# Patient Record
Sex: FEMALE | Race: WHITE | NOT HISPANIC OR LATINO | Employment: OTHER | URBAN - NONMETROPOLITAN AREA
[De-identification: names, ages, dates, MRNs, and addresses within clinical notes are randomized per-mention and may not be internally consistent; named-entity substitution may affect disease eponyms.]

---

## 2023-04-26 ENCOUNTER — TRANSCRIBE ORDERS (OUTPATIENT)
Dept: CT IMAGING | Facility: CLINIC | Age: 58
End: 2023-04-26
Payer: OTHER GOVERNMENT

## 2023-04-26 DIAGNOSIS — T14.8XXA PUNCTURE WOUND: Primary | ICD-10-CM

## 2024-04-23 ENCOUNTER — CONSULT (OUTPATIENT)
Dept: ONCOLOGY | Facility: CLINIC | Age: 59
End: 2024-04-23
Payer: OTHER GOVERNMENT

## 2024-04-23 VITALS
RESPIRATION RATE: 18 BRPM | TEMPERATURE: 98 F | HEART RATE: 77 BPM | HEIGHT: 65 IN | BODY MASS INDEX: 31.82 KG/M2 | SYSTOLIC BLOOD PRESSURE: 145 MMHG | DIASTOLIC BLOOD PRESSURE: 98 MMHG | WEIGHT: 191 LBS | OXYGEN SATURATION: 97 %

## 2024-04-23 DIAGNOSIS — R77.8 ABNORMAL SPEP: Primary | ICD-10-CM

## 2024-04-23 PROCEDURE — 99204 OFFICE O/P NEW MOD 45 MIN: CPT | Performed by: INTERNAL MEDICINE

## 2024-04-23 RX ORDER — OXYBUTYNIN CHLORIDE 15 MG/1
TABLET, EXTENDED RELEASE ORAL
COMMUNITY
Start: 2024-03-05

## 2024-04-23 RX ORDER — BUSPIRONE HYDROCHLORIDE 5 MG/1
TABLET ORAL
COMMUNITY
Start: 2024-03-22

## 2024-04-23 RX ORDER — ESTRADIOL 0.05 MG/D
PATCH, EXTENDED RELEASE TRANSDERMAL
COMMUNITY
Start: 2024-02-23

## 2024-04-23 RX ORDER — DESVENLAFAXINE SUCCINATE 50 MG/1
1 TABLET, EXTENDED RELEASE ORAL DAILY
COMMUNITY

## 2024-04-23 RX ORDER — LEVOTHYROXINE SODIUM 0.05 MG/1
TABLET ORAL
COMMUNITY
Start: 2024-03-05

## 2024-04-23 RX ORDER — TRAMADOL HYDROCHLORIDE 50 MG/1
TABLET ORAL
COMMUNITY

## 2024-04-23 RX ORDER — ROPINIROLE 0.5 MG/1
TABLET, FILM COATED ORAL
COMMUNITY
Start: 2024-03-18

## 2024-04-23 RX ORDER — PREGABALIN 75 MG/1
1 CAPSULE ORAL EVERY 12 HOURS
COMMUNITY
Start: 2024-03-29

## 2024-04-23 RX ORDER — SUMATRIPTAN 100 MG/1
TABLET, FILM COATED ORAL
COMMUNITY
Start: 2024-02-25

## 2024-04-23 NOTE — PROGRESS NOTES
CHIEF COMPLAINT: Diffuse muscle and joint aches    REASON FOR REFERRAL: Elevated IgA and beta-2 globulin      RECORDS OBTAINED  Records of the patients history including those obtained from primary care were reviewed and summarized in detail.    Oncology/Hematology History Overview Note   1.  Elevated beta-2 globulin 0.6 upper limit of normal 0.5 with globulin 0.1 above normal  2.  Migraines  3.  Arthritis with weakly positive MAURICE and history of neck surgery fusion      Hematology history timeline:  -2/21/2024Elevated beta-2 globulin 0.6 upper limit of normal 0.5.  Serum globulin 3.8 upper limit of normal 3.7 previously 3.9 in January.  Otherwise unremarkable CMP.  Elevated total IgA 495 with upper limit of normal 310 and normal quantitative immunoglobulin G and M.  CBC unremarkable including differential and no anemia with hemoglobin 14.6     Abnormal SPEP       HISTORY OF PRESENT ILLNESS:  The patient is a 59 y.o.  female, referred for elevated beta-2 globulin and IgA in the face of fibromyalgia with diffuse myalgias and arthritis    REVIEW OF SYSTEMS:  Other than arthritic and muscle pain has no bone pain per se.  No other somatic complaints    Past Medical History:   Diagnosis Date    Disease of thyroid gland      Past Surgical History:   Procedure Laterality Date    HAMMER TOE REPAIR         Current Outpatient Medications on File Prior to Visit   Medication Sig Dispense Refill    busPIRone (BUSPAR) 5 MG tablet       estradiol (MINIVELLE, VIVELLE-DOT) 0.05 MG/24HR patch       levothyroxine (SYNTHROID, LEVOTHROID) 50 MCG tablet       oxybutynin XL (DITROPAN XL) 15 MG 24 hr tablet       pregabalin (LYRICA) 75 MG capsule Take 1 capsule by mouth Every 12 (Twelve) Hours.      rOPINIRole (REQUIP) 0.5 MG tablet       SUMAtriptan (IMITREX) 100 MG tablet       CeleBREX 200 MG capsule Take 1 capsule by mouth Every 12 (Twelve) Hours.      desvenlafaxine (PRISTIQ) 50 MG 24 hr tablet Take 1 tablet by mouth Daily.       "traMADol (ULTRAM) 50 MG tablet Take 1-2 tablets every 8 hours as needed       No current facility-administered medications on file prior to visit.       Allergies   Allergen Reactions    Oxycodone Hives       Social History     Socioeconomic History    Marital status: Single   Tobacco Use    Smoking status: Never    Smokeless tobacco: Never   Substance and Sexual Activity    Alcohol use: Yes     Alcohol/week: 2.0 standard drinks of alcohol     Types: 2 Glasses of wine per week     Comment: 2 per month    Drug use: Never       Family History   Problem Relation Age of Onset    Dementia Father        PHYSICAL EXAM:  No jaundice icterus or pallor.  No respiratory distress.  No palpable cervical axillary or inguinal adenopathy.  No hepatosplenomegaly.  No chantal synovitis.  No rashes.    /98   Pulse 77   Temp 98 °F (36.7 °C)   Resp 18   Ht 165.1 cm (65\")   Wt 86.6 kg (191 lb)   SpO2 97%   BMI 31.78 kg/m²     ECOG score: 0           ECOG: (0) Fully Active - Able to Carry On All Pre-disease Performance Without Restriction    No results found for: \"HGB\", \"HCT\", \"MCV\", \"PLT\", \"WBC\", \"NEUTROABS\", \"LYMPHSABS\", \"MONOSABS\", \"EOSABS\", \"BASOSABS\"  No results found for: \"GLUCOSE\", \"BUN\", \"CREATININE\", \"NA\", \"K\", \"CL\", \"CO2\", \"CALCIUM\", \"PROTEINTOT\", \"ALBUMIN\", \"BILITOT\", \"ALKPHOS\", \"AST\", \"ALT\"      Assessment & Plan   1.  Elevated beta-2 globulin 0.6 upper limit of normal 0.5 with globulin 0.1 above normal  2.  Migraines  3.  Arthritis with weakly positive MAURICE and history of neck surgery fusion      Hematology history timeline:  -2/21/2024Elevated beta-2 globulin 0.6 upper limit of normal 0.5.  Serum globulin 3.8 upper limit of normal 3.7 previously 3.9 in January.  Otherwise unremarkable CMP.  Elevated total IgA 495 with upper limit of normal 310 and normal quantitative immunoglobulin G and M.  CBC unremarkable including differential and no anemia with hemoglobin 14.6    -4/23/2024 [facility] hematology consult: Patient " comes today because of barely elevated beta-2 globulin and mildly elevated IgA.  Normal CK.  Does have a history of positive MAURICE and all of this can just be autoimmune inflammatory markers as can be the nonspecific MAURICE.  Having said that I will check her serum M panel to be sure there is no monoclonality but polyclonal elevations of IgA and scant elevations of beta-2 globulin in the absence of monoclonality are clinically inconsequential and need no further workup.  She has no hypercalcemia or renal disease or elevations of her alkaline phosphatase.  No abnormalities of her total protein, globulin, albumin, or ratios thereof.  Has various and sundry articular and muscular aches and pains but no specific focal bone pain to suggest lytic disease but should we find monoclonality we will do further bone imaging.  No CRAB criteria in the normal nature of her immunoglobulin IgG and IgM also diminish the likelihood of significant plasma cell dyscrasia or lymphoplasmacytic disorders.  She will continue to see Dr. Soto Winters for fibromyalgia.  Suspect that is the nidus of all of this.    Total time of care today inclusive of time spent today prior to patient's arrival reviewing past hematologic history and cataloging as above and during visit interviewing for signs or symptoms of disease relating to her abnormal protein levels and broad differential thereof and after visit instituting the plan as outlined above took 45 minutes of patient care time throughout the dayToday.  Douglas Mays MD    4/23/2024

## 2024-04-23 NOTE — LETTER
April 23, 2024       No Recipients    Patient: Payton Parks   YOB: 1965   Date of Visit: 4/23/2024     Dear ANKIT Duffy:       Thank you for referring Payton Parks to me for evaluation. Below are the relevant portions of my assessment and plan of care.    If you have questions, please do not hesitate to call me. I look forward to following Payton along with you.         Sincerely,        Douglas Mays MD        CC:   No Recipients    Douglas Mays MD  04/23/24 1326  Sign when Signing Visit  CHIEF COMPLAINT: Diffuse muscle and joint aches    REASON FOR REFERRAL: Elevated IgA and beta-2 globulin      RECORDS OBTAINED  Records of the patients history including those obtained from primary care were reviewed and summarized in detail.    Oncology/Hematology History Overview Note   1.  Elevated beta-2 globulin 0.6 upper limit of normal 0.5 with globulin 0.1 above normal  2.  Migraines  3.  Arthritis with weakly positive MAURICE and history of neck surgery fusion      Hematology history timeline:  -2/21/2024Elevated beta-2 globulin 0.6 upper limit of normal 0.5.  Serum globulin 3.8 upper limit of normal 3.7 previously 3.9 in January.  Otherwise unremarkable CMP.  Elevated total IgA 495 with upper limit of normal 310 and normal quantitative immunoglobulin G and M.  CBC unremarkable including differential and no anemia with hemoglobin 14.6     Abnormal SPEP       HISTORY OF PRESENT ILLNESS:  The patient is a 59 y.o.  female, referred for elevated beta-2 globulin and IgA in the face of fibromyalgia with diffuse myalgias and arthritis    REVIEW OF SYSTEMS:  Other than arthritic and muscle pain has no bone pain per se.  No other somatic complaints    Past Medical History:   Diagnosis Date   • Disease of thyroid gland      Past Surgical History:   Procedure Laterality Date   • HAMMER TOE REPAIR         Current Outpatient Medications on File Prior to Visit   Medication Sig Dispense Refill   • busPIRone  "(BUSPAR) 5 MG tablet      • estradiol (MINIVELLE, VIVELLE-DOT) 0.05 MG/24HR patch      • levothyroxine (SYNTHROID, LEVOTHROID) 50 MCG tablet      • oxybutynin XL (DITROPAN XL) 15 MG 24 hr tablet      • pregabalin (LYRICA) 75 MG capsule Take 1 capsule by mouth Every 12 (Twelve) Hours.     • rOPINIRole (REQUIP) 0.5 MG tablet      • SUMAtriptan (IMITREX) 100 MG tablet      • CeleBREX 200 MG capsule Take 1 capsule by mouth Every 12 (Twelve) Hours.     • desvenlafaxine (PRISTIQ) 50 MG 24 hr tablet Take 1 tablet by mouth Daily.     • traMADol (ULTRAM) 50 MG tablet Take 1-2 tablets every 8 hours as needed       No current facility-administered medications on file prior to visit.       Allergies   Allergen Reactions   • Oxycodone Hives       Social History     Socioeconomic History   • Marital status: Single   Tobacco Use   • Smoking status: Never   • Smokeless tobacco: Never   Substance and Sexual Activity   • Alcohol use: Yes     Alcohol/week: 2.0 standard drinks of alcohol     Types: 2 Glasses of wine per week     Comment: 2 per month   • Drug use: Never       Family History   Problem Relation Age of Onset   • Dementia Father        PHYSICAL EXAM:  No jaundice icterus or pallor.  No respiratory distress.  No palpable cervical axillary or inguinal adenopathy.  No hepatosplenomegaly.  No chantal synovitis.  No rashes.    /98   Pulse 77   Temp 98 °F (36.7 °C)   Resp 18   Ht 165.1 cm (65\")   Wt 86.6 kg (191 lb)   SpO2 97%   BMI 31.78 kg/m²     ECOG score: 0           ECOG: (0) Fully Active - Able to Carry On All Pre-disease Performance Without Restriction    No results found for: \"HGB\", \"HCT\", \"MCV\", \"PLT\", \"WBC\", \"NEUTROABS\", \"LYMPHSABS\", \"MONOSABS\", \"EOSABS\", \"BASOSABS\"  No results found for: \"GLUCOSE\", \"BUN\", \"CREATININE\", \"NA\", \"K\", \"CL\", \"CO2\", \"CALCIUM\", \"PROTEINTOT\", \"ALBUMIN\", \"BILITOT\", \"ALKPHOS\", \"AST\", \"ALT\"      Assessment & Plan  1.  Elevated beta-2 globulin 0.6 upper limit of normal 0.5 with globulin " 0.1 above normal  2.  Migraines  3.  Arthritis with weakly positive MAURICE and history of neck surgery fusion      Hematology history timeline:  -2/21/2024Elevated beta-2 globulin 0.6 upper limit of normal 0.5.  Serum globulin 3.8 upper limit of normal 3.7 previously 3.9 in January.  Otherwise unremarkable CMP.  Elevated total IgA 495 with upper limit of normal 310 and normal quantitative immunoglobulin G and M.  CBC unremarkable including differential and no anemia with hemoglobin 14.6    -4/23/2024 [facility]ology consult: Patient comes today because of barely elevated beta-2 globulin and mildly elevated IgA.  Normal CK.  Does have a history of positive MAURICE and all of this can just be autoimmune inflammatory markers as can be the nonspecific MAURICE.  Having said that I will check her serum M panel to be sure there is no monoclonality but polyclonal elevations of IgA and scant elevations of beta-2 globulin in the absence of monoclonality are clinically inconsequential and need no further workup.  She has no hypercalcemia or renal disease or elevations of her alkaline phosphatase.  No abnormalities of her total protein, globulin, albumin, or ratios thereof.  Has various and sundry articular and muscular aches and pains but no specific focal bone pain to suggest lytic disease but should we find monoclonality we will do further bone imaging.  No CRAB criteria in the normal nature of her immunoglobulin IgG and IgM also diminish the likelihood of significant plasma cell dyscrasia or lymphoplasmacytic disorders.  She will continue to see Dr. Soto Winters for fibromyalgia.  Suspect that is the nidus of all of this.    Total time of care today inclusive of time spent today prior to patient's arrival reviewing past hematologic history and cataloging as above and during visit interviewing for signs or symptoms of disease relating to her abnormal protein levels and broad differential thereof and after visit instituting the plan as  outlined above took 45 minutes of patient care time throughout the dayToday.  Douglas Mays MD    4/23/2024

## 2024-05-01 LAB
ALBUMIN 24H MFR UR ELPH: 60.8 %
ALPHA1 GLOB 24H MFR UR ELPH: 4.8 %
ALPHA2 GLOB 24H MFR UR ELPH: 13.7 %
B-GLOBULIN MFR UR ELPH: 11.4 %
GAMMA GLOB 24H MFR UR ELPH: 9.3 %
INTERPRETATION UR IFE-IMP: NORMAL
Lab: NORMAL
M PROTEIN 24H MFR UR ELPH: NORMAL %
PROT 24H UR-MRATE: 113 MG/24 HR (ref 30–150)
PROT UR-MCNC: 4.2 MG/DL

## 2024-05-02 LAB
ALBUMIN SERPL ELPH-MCNC: 3.6 G/DL (ref 2.9–4.4)
ALBUMIN SERPL-MCNC: 4.1 G/DL (ref 3.8–4.9)
ALBUMIN/GLOB SERPL: 1.1 {RATIO} (ref 0.7–1.7)
ALBUMIN/GLOB SERPL: 1.4 {RATIO} (ref 1.2–2.2)
ALP SERPL-CCNC: 106 IU/L (ref 44–121)
ALPHA1 GLOB SERPL ELPH-MCNC: 0.2 G/DL (ref 0–0.4)
ALPHA2 GLOB SERPL ELPH-MCNC: 0.6 G/DL (ref 0.4–1)
ALT SERPL-CCNC: 28 IU/L (ref 0–32)
AST SERPL-CCNC: 28 IU/L (ref 0–40)
B-GLOBULIN SERPL ELPH-MCNC: 1.2 G/DL (ref 0.7–1.3)
B2 MICROGLOB SERPL-MCNC: 1.7 MG/L (ref 0.6–2.4)
BASOPHILS # BLD AUTO: 0 X10E3/UL (ref 0–0.2)
BASOPHILS NFR BLD AUTO: 1 %
BILIRUB SERPL-MCNC: 0.2 MG/DL (ref 0–1.2)
BUN SERPL-MCNC: 7 MG/DL (ref 6–24)
BUN/CREAT SERPL: 10 (ref 9–23)
CALCIUM SERPL-MCNC: 9.7 MG/DL (ref 8.7–10.2)
CHLORIDE SERPL-SCNC: 100 MMOL/L (ref 96–106)
CO2 SERPL-SCNC: 24 MMOL/L (ref 20–29)
CREAT SERPL-MCNC: 0.73 MG/DL (ref 0.57–1)
CRP SERPL-MCNC: 2 MG/L (ref 0–10)
EGFRCR SERPLBLD CKD-EPI 2021: 95 ML/MIN/1.73
EOSINOPHIL # BLD AUTO: 0.2 X10E3/UL (ref 0–0.4)
EOSINOPHIL NFR BLD AUTO: 5 %
ERYTHROCYTE [DISTWIDTH] IN BLOOD BY AUTOMATED COUNT: 12.8 % (ref 11.7–15.4)
ERYTHROCYTE [SEDIMENTATION RATE] IN BLOOD BY WESTERGREN METHOD: 3 MM/HR (ref 0–40)
GAMMA GLOB SERPL ELPH-MCNC: 1.3 G/DL (ref 0.4–1.8)
GLOBULIN SER CALC-MCNC: 2.9 G/DL (ref 1.5–4.5)
GLOBULIN SER-MCNC: 3.4 G/DL (ref 2.2–3.9)
GLUCOSE SERPL-MCNC: 96 MG/DL (ref 70–99)
HCT VFR BLD AUTO: 39.2 % (ref 34–46.6)
HGB BLD-MCNC: 13.2 G/DL (ref 11.1–15.9)
IGA SERPL-MCNC: 420 MG/DL (ref 87–352)
IGE SERPL-ACNC: 115 IU/ML (ref 6–495)
IGG SERPL-MCNC: 1358 MG/DL (ref 586–1602)
IGM SERPL-MCNC: 152 MG/DL (ref 26–217)
IMM GRANULOCYTES # BLD AUTO: 0 X10E3/UL (ref 0–0.1)
IMM GRANULOCYTES NFR BLD AUTO: 0 %
INTERPRETATION SERPL IEP-IMP: ABNORMAL
KAPPA LC FREE SER-MCNC: 29.7 MG/L (ref 3.3–19.4)
KAPPA LC FREE/LAMBDA FREE SER: 1.25 {RATIO} (ref 0.26–1.65)
LABORATORY COMMENT REPORT: ABNORMAL
LAMBDA LC FREE SERPL-MCNC: 23.8 MG/L (ref 5.7–26.3)
LYMPHOCYTES # BLD AUTO: 1.8 X10E3/UL (ref 0.7–3.1)
LYMPHOCYTES NFR BLD AUTO: 39 %
M PROTEIN SERPL ELPH-MCNC: ABNORMAL G/DL
MCH RBC QN AUTO: 30.6 PG (ref 26.6–33)
MCHC RBC AUTO-ENTMCNC: 33.7 G/DL (ref 31.5–35.7)
MCV RBC AUTO: 91 FL (ref 79–97)
MONOCYTES # BLD AUTO: 0.5 X10E3/UL (ref 0.1–0.9)
MONOCYTES NFR BLD AUTO: 10 %
NEUTROPHILS # BLD AUTO: 2.1 X10E3/UL (ref 1.4–7)
NEUTROPHILS NFR BLD AUTO: 45 %
PLATELET # BLD AUTO: 374 X10E3/UL (ref 150–450)
POTASSIUM SERPL-SCNC: 4.6 MMOL/L (ref 3.5–5.2)
PROT SERPL-MCNC: 7 G/DL (ref 6–8.5)
RBC # BLD AUTO: 4.31 X10E6/UL (ref 3.77–5.28)
SODIUM SERPL-SCNC: 138 MMOL/L (ref 134–144)
WBC # BLD AUTO: 4.7 X10E3/UL (ref 3.4–10.8)

## 2024-06-11 ENCOUNTER — OFFICE VISIT (OUTPATIENT)
Dept: ONCOLOGY | Facility: CLINIC | Age: 59
End: 2024-06-11
Payer: OTHER GOVERNMENT

## 2024-06-11 VITALS
BODY MASS INDEX: 31.49 KG/M2 | HEART RATE: 76 BPM | RESPIRATION RATE: 18 BRPM | HEIGHT: 65 IN | WEIGHT: 189 LBS | SYSTOLIC BLOOD PRESSURE: 141 MMHG | DIASTOLIC BLOOD PRESSURE: 93 MMHG | OXYGEN SATURATION: 97 % | TEMPERATURE: 97.1 F

## 2024-06-11 DIAGNOSIS — R77.8 ABNORMAL SPEP: Primary | ICD-10-CM

## 2024-06-11 PROCEDURE — 99214 OFFICE O/P EST MOD 30 MIN: CPT | Performed by: INTERNAL MEDICINE

## 2024-06-11 NOTE — PROGRESS NOTES
CHIEF COMPLAINT: Arthritis    Problem List:  Oncology/Hematology History Overview Note   1.  Elevated beta-2 globulin 0.6 upper limit of normal 0.5 with globulin 0.1 above normal  2.  Migraines  3.  Arthritis with weakly positive MAURICE and history of neck surgery fusion      Hematology history timeline:  -2/21/2024Elevated beta-2 globulin 0.6 upper limit of normal 0.5.  Serum globulin 3.8 upper limit of normal 3.7 previously 3.9 in January.  Otherwise unremarkable CMP.  Elevated total IgA 495 with upper limit of normal 310 and normal quantitative immunoglobulin G and M.  CBC unremarkable including differential and no anemia with hemoglobin 14.6    -4/23/2024 [facility] hematology consult: Patient comes today because of barely elevated beta-2 globulin and mildly elevated IgA.  Normal CK.  Does have a history of positive MAURICE and all of this can just be autoimmune inflammatory markers as can be the nonspecific MAURICE.  Having said that I will check her serum M panel to be sure there is no monoclonality but polyclonal elevations of IgA and scant elevations of beta-2 globulin in the absence of monoclonality are clinically inconsequential and need no further workup.  She has no hypercalcemia or renal disease or elevations of her alkaline phosphatase.  No abnormalities of her total protein, globulin, albumin, or ratios thereof.  Has various and sundry articular and muscular aches and pains but no specific focal bone pain to suggest lytic disease but should we find monoclonality we will do further bone imaging.  No CRAB criteria in the normal nature of her immunoglobulin IgG and IgM also diminish the likelihood of significant plasma cell dyscrasia or lymphoplasmacytic disorders.    -4/25/2024 CBC unremarkable with hemoglobin 13.2.  CMP unremarkable with creatinine 0.73 and calcium 9.7 with normal total protein, albumin, globulin, ratios thereof.  Total IgA slightly elevated 420 upper limit of normal 352 with otherwise normal  "quantitative immunoglobulins and no M spike and no abnormalities of beta globulin.  Inconsequential mild elevation of serum kappa 29.7 (upper limit of normal 19.4) lambda normal 23.8 with ratio 1.25.  Sedimentation rate 3.  C-reactive protein 2.  Beta-2 microglobulin 1.7.  All normal.  Urine immunofixation electrophoresis protein electrophoresis showed no M spike and normal 24-hour urine protein.    -6/11/2024 [facility]ology follow-up: Reviewed the 4/25/2024 data with her.  No monoclonal gammopathy and no significant abnormalities of her SIFE/SIEP/U IEP/serum light chains and no hematological abnormalities.  Does not need further evaluation or workup.  No need for follow-up with hematologist.  In concert scant elevation of kappa likely related to positive MAURICE that is also likely nonspecific but defer to primary care rheumatology referral as to relevance with history of neck surgery fusion and arthritis.     Abnormal SPEP       HISTORY OF PRESENT ILLNESS:  The patient is a 59 y.o. female, here for follow up on management of arthritis.  Questionable abnormality of serum protein electrophoresis    Past Medical History:   Diagnosis Date    Disease of thyroid gland     Fibromyalgia     Migraines      Past Surgical History:   Procedure Laterality Date    HAMMER TOE REPAIR         Allergies   Allergen Reactions    Oxycodone Hives       Family History and Social History reviewed and changed as necessary    REVIEW OF SYSTEM:   Note new somatic complaint    PHYSICAL EXAM:  Jaundice icterus or pallor.  No respiratory distress.  No rashes.  No chantal synovitis.  No abdominal tenderness.    Vitals:    06/11/24 1459   Resp: 18   Height: 165.1 cm (65\")     There were no vitals filed for this visit.       ECOG score: 0           Vitals reviewed.      Lab Results   Component Value Date    HGB 13.2 04/25/2024    HCT 39.2 04/25/2024    MCV 91 04/25/2024     04/25/2024    WBC 4.7 04/25/2024    NEUTROABS 2.1 04/25/2024    " LYMPHSABS 1.8 04/25/2024    MONOSABS 0.5 04/25/2024    EOSABS 0.2 04/25/2024    BASOSABS 0.0 04/25/2024       Lab Results   Component Value Date    GLUCOSE 96 04/25/2024    BUN 7 04/25/2024    CREATININE 0.73 04/25/2024     04/25/2024    K 4.6 04/25/2024     04/25/2024    CO2 24 04/25/2024    CALCIUM 9.7 04/25/2024    ALBUMIN 3.6 04/25/2024    ALBUMIN 4.1 04/25/2024    BILITOT 0.2 04/25/2024    ALKPHOS 106 04/25/2024    AST 28 04/25/2024    ALT 28 04/25/2024             ASSESSMENT & PLAN:  1.  Elevated beta-2 globulin 0.6 upper limit of normal 0.5 with globulin 0.1 above normal  2.  Migraines  3.  Arthritis with weakly positive MAURICE and history of neck surgery fusion      Hematology history timeline:  -2/21/2024Elevated beta-2 globulin 0.6 upper limit of normal 0.5.  Serum globulin 3.8 upper limit of normal 3.7 previously 3.9 in January.  Otherwise unremarkable CMP.  Elevated total IgA 495 with upper limit of normal 310 and normal quantitative immunoglobulin G and M.  CBC unremarkable including differential and no anemia with hemoglobin 14.6    -4/23/2024 [facility] hematology consult: Patient comes today because of barely elevated beta-2 globulin and mildly elevated IgA.  Normal CK.  Does have a history of positive MAURICE and all of this can just be autoimmune inflammatory markers as can be the nonspecific MAURICE.  Having said that I will check her serum M panel to be sure there is no monoclonality but polyclonal elevations of IgA and scant elevations of beta-2 globulin in the absence of monoclonality are clinically inconsequential and need no further workup.  She has no hypercalcemia or renal disease or elevations of her alkaline phosphatase.  No abnormalities of her total protein, globulin, albumin, or ratios thereof.  Has various and sundry articular and muscular aches and pains but no specific focal bone pain to suggest lytic disease but should we find monoclonality we will do further bone imaging.  No CRAB  criteria in the normal nature of her immunoglobulin IgG and IgM also diminish the likelihood of significant plasma cell dyscrasia or lymphoplasmacytic disorders.    -4/25/2024 CBC unremarkable with hemoglobin 13.2.  CMP unremarkable with creatinine 0.73 and calcium 9.7 with normal total protein, albumin, globulin, ratios thereof.  Total IgA slightly elevated 420 upper limit of normal 352 with otherwise normal quantitative immunoglobulins and no M spike and no abnormalities of beta globulin.  Inconsequential mild elevation of serum kappa 29.7 (upper limit of normal 19.4) lambda normal 23.8 with ratio 1.25.  Sedimentation rate 3.  C-reactive protein 2.  Beta-2 microglobulin 1.7.  All normal.  Urine immunofixation electrophoresis protein electrophoresis showed no M spike and normal 24-hour urine protein.    -6/11/2024 [facility] follow-up: Reviewed the 4/25/2024 data with her.  No monoclonal gammopathy and no significant abnormalities of her SIFE/SIEP/U IEP/serum light chains and no hematological abnormalities.  Does not need further evaluation or workup.  No need for follow-up with hematologist.  In concert scant elevation of kappa likely related to positive MAURICE that is also likely nonspecific but defer to primary care rheumatology referral as to relevance with history of neck surgery fusion and arthritis.    Total time of care today inclusive of time spent today prior to patient's arrival reviewing interval data as outlined above and during visit translating that information to her and communicating this subsequently to her primary care and after visit instituting this plan took 33 minutes patient care time throughout the day today.  Douglas Mays MD    06/11/2024

## 2024-06-11 NOTE — LETTER
June 11, 2024       No Recipients    Patient: aPyton Parks   YOB: 1965   Date of Visit: 6/11/2024     Dear ANKIT Duffy:       Thank you for referring Payton Parks to me for evaluation. Below are the relevant portions of my assessment and plan of care.    If you have questions, please do not hesitate to call me. I look forward to following Payton along with you.         Sincerely,        Douglsa Mays MD        CC:   No Recipients    Douglas Mays MD  06/11/24 1509  Sign when Signing Visit  CHIEF COMPLAINT: Arthritis    Problem List:  Oncology/Hematology History Overview Note   1.  Elevated beta-2 globulin 0.6 upper limit of normal 0.5 with globulin 0.1 above normal  2.  Migraines  3.  Arthritis with weakly positive MAURICE and history of neck surgery fusion      Hematology history timeline:  -2/21/2024Elevated beta-2 globulin 0.6 upper limit of normal 0.5.  Serum globulin 3.8 upper limit of normal 3.7 previously 3.9 in January.  Otherwise unremarkable CMP.  Elevated total IgA 495 with upper limit of normal 310 and normal quantitative immunoglobulin G and M.  CBC unremarkable including differential and no anemia with hemoglobin 14.6    -4/23/2024 [facility] hematology consult: Patient comes today because of barely elevated beta-2 globulin and mildly elevated IgA.  Normal CK.  Does have a history of positive MAURICE and all of this can just be autoimmune inflammatory markers as can be the nonspecific MAURICE.  Having said that I will check her serum M panel to be sure there is no monoclonality but polyclonal elevations of IgA and scant elevations of beta-2 globulin in the absence of monoclonality are clinically inconsequential and need no further workup.  She has no hypercalcemia or renal disease or elevations of her alkaline phosphatase.  No abnormalities of her total protein, globulin, albumin, or ratios thereof.  Has various and sundry articular and muscular aches and pains but no specific focal  bone pain to suggest lytic disease but should we find monoclonality we will do further bone imaging.  No CRAB criteria in the normal nature of her immunoglobulin IgG and IgM also diminish the likelihood of significant plasma cell dyscrasia or lymphoplasmacytic disorders.    -4/25/2024 CBC unremarkable with hemoglobin 13.2.  CMP unremarkable with creatinine 0.73 and calcium 9.7 with normal total protein, albumin, globulin, ratios thereof.  Total IgA slightly elevated 420 upper limit of normal 352 with otherwise normal quantitative immunoglobulins and no M spike and no abnormalities of beta globulin.  Inconsequential mild elevation of serum kappa 29.7 (upper limit of normal 19.4) lambda normal 23.8 with ratio 1.25.  Sedimentation rate 3.  C-reactive protein 2.  Beta-2 microglobulin 1.7.  All normal.  Urine immunofixation electrophoresis protein electrophoresis showed no M spike and normal 24-hour urine protein.    -6/11/2024 [facility] hematology follow-up: Reviewed the 4/25/2024 data with her.  No monoclonal gammopathy and no significant abnormalities of her SIFE/SIEP/U IEP/serum light chains and no hematological abnormalities.  Does not need further evaluation or workup.  No need for follow-up with hematologist.  In concert scant elevation of kappa likely related to positive MAURICE that is also likely nonspecific but defer to primary care rheumatology referral as to relevance with history of neck surgery fusion and arthritis.     Abnormal SPEP       HISTORY OF PRESENT ILLNESS:  The patient is a 59 y.o. female, here for follow up on management of arthritis.  Questionable abnormality of serum protein electrophoresis    Past Medical History:   Diagnosis Date   • Disease of thyroid gland    • Fibromyalgia    • Migraines      Past Surgical History:   Procedure Laterality Date   • HAMMER TOE REPAIR         Allergies   Allergen Reactions   • Oxycodone Hives       Family History and Social History reviewed and changed as  "necessary    REVIEW OF SYSTEM:   Note new somatic complaint    PHYSICAL EXAM:  Jaundice icterus or pallor.  No respiratory distress.  No rashes.  No chantal synovitis.  No abdominal tenderness.    Vitals:    06/11/24 1459   Resp: 18   Height: 165.1 cm (65\")     There were no vitals filed for this visit.       ECOG score: 0           Vitals reviewed.      Lab Results   Component Value Date    HGB 13.2 04/25/2024    HCT 39.2 04/25/2024    MCV 91 04/25/2024     04/25/2024    WBC 4.7 04/25/2024    NEUTROABS 2.1 04/25/2024    LYMPHSABS 1.8 04/25/2024    MONOSABS 0.5 04/25/2024    EOSABS 0.2 04/25/2024    BASOSABS 0.0 04/25/2024       Lab Results   Component Value Date    GLUCOSE 96 04/25/2024    BUN 7 04/25/2024    CREATININE 0.73 04/25/2024     04/25/2024    K 4.6 04/25/2024     04/25/2024    CO2 24 04/25/2024    CALCIUM 9.7 04/25/2024    ALBUMIN 3.6 04/25/2024    ALBUMIN 4.1 04/25/2024    BILITOT 0.2 04/25/2024    ALKPHOS 106 04/25/2024    AST 28 04/25/2024    ALT 28 04/25/2024             ASSESSMENT & PLAN:  1.  Elevated beta-2 globulin 0.6 upper limit of normal 0.5 with globulin 0.1 above normal  2.  Migraines  3.  Arthritis with weakly positive MAURICE and history of neck surgery fusion      Hematology history timeline:  -2/21/2024Elevated beta-2 globulin 0.6 upper limit of normal 0.5.  Serum globulin 3.8 upper limit of normal 3.7 previously 3.9 in January.  Otherwise unremarkable CMP.  Elevated total IgA 495 with upper limit of normal 310 and normal quantitative immunoglobulin G and M.  CBC unremarkable including differential and no anemia with hemoglobin 14.6    -4/23/2024 [facility] hematology consult: Patient comes today because of barely elevated beta-2 globulin and mildly elevated IgA.  Normal CK.  Does have a history of positive MAURICE and all of this can just be autoimmune inflammatory markers as can be the nonspecific MAURICE.  Having said that I will check her serum M panel to be sure there is no " monoclonality but polyclonal elevations of IgA and scant elevations of beta-2 globulin in the absence of monoclonality are clinically inconsequential and need no further workup.  She has no hypercalcemia or renal disease or elevations of her alkaline phosphatase.  No abnormalities of her total protein, globulin, albumin, or ratios thereof.  Has various and sundry articular and muscular aches and pains but no specific focal bone pain to suggest lytic disease but should we find monoclonality we will do further bone imaging.  No CRAB criteria in the normal nature of her immunoglobulin IgG and IgM also diminish the likelihood of significant plasma cell dyscrasia or lymphoplasmacytic disorders.    -4/25/2024 CBC unremarkable with hemoglobin 13.2.  CMP unremarkable with creatinine 0.73 and calcium 9.7 with normal total protein, albumin, globulin, ratios thereof.  Total IgA slightly elevated 420 upper limit of normal 352 with otherwise normal quantitative immunoglobulins and no M spike and no abnormalities of beta globulin.  Inconsequential mild elevation of serum kappa 29.7 (upper limit of normal 19.4) lambda normal 23.8 with ratio 1.25.  Sedimentation rate 3.  C-reactive protein 2.  Beta-2 microglobulin 1.7.  All normal.  Urine immunofixation electrophoresis protein electrophoresis showed no M spike and normal 24-hour urine protein.    -6/11/2024 [facility] hematology follow-up: Reviewed the 4/25/2024 data with her.  No monoclonal gammopathy and no significant abnormalities of her SIFE/SIEP/U IEP/serum light chains and no hematological abnormalities.  Does not need further evaluation or workup.  No need for follow-up with hematologist.  In concert scant elevation of kappa likely related to positive MAURICE that is also likely nonspecific but defer to primary care rheumatology referral as to relevance with history of neck surgery fusion and arthritis.    Total time of care today inclusive of time spent today prior to patient's  arrival reviewing interval data as outlined above and during visit translating that information to her and communicating this subsequently to her primary care and after visit instituting this plan took 33 minutes patient care time throughout the day today.  Douglas Mays MD    06/11/2024

## 2024-07-01 ENCOUNTER — LAB (OUTPATIENT)
Facility: HOSPITAL | Age: 59
End: 2024-07-01
Payer: OTHER GOVERNMENT

## 2024-07-01 ENCOUNTER — OFFICE VISIT (OUTPATIENT)
Age: 59
End: 2024-07-01
Payer: OTHER GOVERNMENT

## 2024-07-01 VITALS
BODY MASS INDEX: 31.69 KG/M2 | HEIGHT: 65 IN | TEMPERATURE: 97.5 F | WEIGHT: 190.2 LBS | HEART RATE: 87 BPM | DIASTOLIC BLOOD PRESSURE: 78 MMHG | SYSTOLIC BLOOD PRESSURE: 126 MMHG

## 2024-07-01 DIAGNOSIS — M79.7 FIBROMYALGIA: ICD-10-CM

## 2024-07-01 DIAGNOSIS — Z79.899 HIGH RISK MEDICATION USE: ICD-10-CM

## 2024-07-01 DIAGNOSIS — M20.41 HAMMERTOES OF BOTH FEET: ICD-10-CM

## 2024-07-01 DIAGNOSIS — G62.9 NEUROPATHY: ICD-10-CM

## 2024-07-01 DIAGNOSIS — M15.9 GENERALIZED OSTEOARTHROSIS, INVOLVING MULTIPLE SITES: ICD-10-CM

## 2024-07-01 DIAGNOSIS — Z98.1 S/P CERVICAL SPINAL FUSION: ICD-10-CM

## 2024-07-01 DIAGNOSIS — M79.7 FIBROMYALGIA: Primary | ICD-10-CM

## 2024-07-01 DIAGNOSIS — M20.42 HAMMERTOES OF BOTH FEET: ICD-10-CM

## 2024-07-01 PROBLEM — L40.50 PSORIATIC ARTHRITIS OF MULTIPLE JOINTS: Status: ACTIVE | Noted: 2024-07-01

## 2024-07-01 LAB
BASOPHILS # BLD AUTO: 0.07 10*3/MM3 (ref 0–0.2)
BASOPHILS NFR BLD AUTO: 0.9 % (ref 0–1.5)
DEPRECATED RDW RBC AUTO: 39.8 FL (ref 37–54)
EOSINOPHIL # BLD AUTO: 0.26 10*3/MM3 (ref 0–0.4)
EOSINOPHIL NFR BLD AUTO: 3.4 % (ref 0.3–6.2)
ERYTHROCYTE [DISTWIDTH] IN BLOOD BY AUTOMATED COUNT: 12.1 % (ref 12.3–15.4)
ERYTHROCYTE [SEDIMENTATION RATE] IN BLOOD: 13 MM/HR (ref 0–30)
HBA1C MFR BLD: 5.4 % (ref 4.8–5.6)
HCT VFR BLD AUTO: 39.4 % (ref 34–46.6)
HGB BLD-MCNC: 13.1 G/DL (ref 12–15.9)
IMM GRANULOCYTES # BLD AUTO: 0.02 10*3/MM3 (ref 0–0.05)
IMM GRANULOCYTES NFR BLD AUTO: 0.3 % (ref 0–0.5)
LYMPHOCYTES # BLD AUTO: 2.83 10*3/MM3 (ref 0.7–3.1)
LYMPHOCYTES NFR BLD AUTO: 37.2 % (ref 19.6–45.3)
MCH RBC QN AUTO: 30.3 PG (ref 26.6–33)
MCHC RBC AUTO-ENTMCNC: 33.2 G/DL (ref 31.5–35.7)
MCV RBC AUTO: 91 FL (ref 79–97)
MONOCYTES # BLD AUTO: 0.58 10*3/MM3 (ref 0.1–0.9)
MONOCYTES NFR BLD AUTO: 7.6 % (ref 5–12)
NEUTROPHILS NFR BLD AUTO: 3.85 10*3/MM3 (ref 1.7–7)
NEUTROPHILS NFR BLD AUTO: 50.6 % (ref 42.7–76)
NRBC BLD AUTO-RTO: 0 /100 WBC (ref 0–0.2)
PLATELET # BLD AUTO: 373 10*3/MM3 (ref 140–450)
PMV BLD AUTO: 9.6 FL (ref 6–12)
RBC # BLD AUTO: 4.33 10*6/MM3 (ref 3.77–5.28)
WBC NRBC COR # BLD AUTO: 7.61 10*3/MM3 (ref 3.4–10.8)

## 2024-07-01 PROCEDURE — 85025 COMPLETE CBC W/AUTO DIFF WBC: CPT

## 2024-07-01 PROCEDURE — 86140 C-REACTIVE PROTEIN: CPT

## 2024-07-01 PROCEDURE — 36415 COLL VENOUS BLD VENIPUNCTURE: CPT

## 2024-07-01 PROCEDURE — 83036 HEMOGLOBIN GLYCOSYLATED A1C: CPT

## 2024-07-01 PROCEDURE — 99214 OFFICE O/P EST MOD 30 MIN: CPT | Performed by: INTERNAL MEDICINE

## 2024-07-01 PROCEDURE — 85652 RBC SED RATE AUTOMATED: CPT

## 2024-07-01 PROCEDURE — 82607 VITAMIN B-12: CPT

## 2024-07-01 PROCEDURE — 80053 COMPREHEN METABOLIC PANEL: CPT

## 2024-07-01 PROCEDURE — 84165 PROTEIN E-PHORESIS SERUM: CPT

## 2024-07-01 RX ORDER — VIBEGRON 75 MG/1
1 TABLET, FILM COATED ORAL DAILY
COMMUNITY
Start: 2024-06-25

## 2024-07-01 RX ORDER — PREGABALIN 100 MG/1
100 CAPSULE ORAL 2 TIMES DAILY
Qty: 180 CAPSULE | Refills: 1 | Status: SHIPPED | OUTPATIENT
Start: 2024-07-01

## 2024-07-01 RX ORDER — LEVETIRACETAM 500 MG/1
500 TABLET ORAL DAILY
COMMUNITY
Start: 2024-06-04

## 2024-07-01 NOTE — ASSESSMENT & PLAN NOTE
Moved from [place] .  Former   of alcoholism  .  Lives in [place] with her fiance who has terminal cancer   Hairdresser by trade.  s/p bilateral knee replacements, cervical spine fusion , [facility] joint surgery  Prior spine injections/rhizotomy with pain management in [place]  X-rays 3/29/2024 right foot x-ray shows ? pencil in cup erosive deformity right 2nd toe IP joint as well as excessive bone formation around the distal left fibula head (she had prior surgery right 2nd toe); son with psoriasis  Labs 3/29/2024: Normal sed rate/CRP/HLA-B27/rheumatoid factor/ACPA, +MAURICE 1:320 with negative ZACARIAS panel  Specialists: Neurology neurosurgery, podiatry  Dr. Douglas Mays dismissed 2024 after no monoclonal gammopathy found  **Current:  Celebrex, tramadol(PCP) pregabalin(ACL)  Prior Otezla PA 3/29/24(Humira prerferred by her insurance),   Tried:  physical therapy, injection left hip with orthopedics in [place].      She reports widespread musculoskeletal pain.  Her muscles her to press on.  I think she likely has fibromyalgia along with osteoarthritis.  There has been consideration for psoriatic arthritis based on x-ray right second toe with question of pencil in cup deformity, although I think this is actually more from prior surgery on the toe and not true psoriatic arthritis.  There is no history of psoriasis herself although her son has psoriasis.  Her insurance declined Otezla  and she is considering Humira which is their preferred biologic, but she is reluctant given the risk of infection with it.  At this time I would avoid starting Humira/biologic therapy as I do not see evidence of active inflammatory arthritis      Overall she reports significant improvement chronic pain with pregabalin 75 mg twice daily but still burning in the feet.  Continue p.r.n. NSAID.   Increase pregabalin 100 mg twice daily for fibromyalgia.   Consult [facility] neurosurgery with her history of cervical fusion  years ago while living in [place] around 2011.  She previously tried physical therapy  Obtain labs today as below  Return to clinic 6 months    She has widespread tender points above and below the waist consistent with fibromyalgia.  She is under lot of stress as her fiance has terminal cancer  Continue pregabalin  Discussed potential side effects of pregabalin including dizziness sedation falls allergic reaction memory disturbance  Pain management agreement signed 7/1/2024.  Redd reviewed and appropriate.  Patient is at low risk for medication abuse   - I have encouraged regular low impact aerobic exercise up to 30 minutes per day. If this is unattainable, recommend a graded exercise program starting with 5 minutes of dedicated cardiovascular exercise daily, increasing by 1 minute daily until goal of 30 minutes daily is reached.  - I have recommended conservative measures to improve sleep  - consider referral to sleep medicine for EUGENE screening  - recommend avoiding narcotics studies have shown that narcotics can worsen fibromyalgia pain.   - Counseled on alternative therapies that can help with pain including massage therapy, aquatic therapy, physical therapy, acupuncture, chiropractics, and psychology evaluation

## 2024-07-01 NOTE — ASSESSMENT & PLAN NOTE
Moved from [place] .  Former   of alcoholism  .  Lives in [place] with her fiance who has terminal cancer   Hairdresser by trade.  s/p bilateral knee replacements, cervical spine fusion , [facility] joint surgery  Prior spine injections/rhizotomy with pain management in [place]  **Current:  Otezla PA 3/29/24(Humira prerferred by her insurance), Celebrex, tramadol(PCP) pregabalin(ACL),  Tried:  physical therapy, injection left hip with orthopedics in [place].      Patient presents back to rheumatology clinic after 2 year absence.  She reports worsening widespread musculoskeletal pain.  Her muscles her to press on.  I think she likely has fibromyalgia.  She also has developed widespread itchy rash dorsum of her hands, arms, chest.  She has upcoming initial dermatology consult in the next few weeks.  I wonder if this could be psoriasis.    Today we obtained labs as below along with x-rays of the cervical spine, hands and feet.  X-rays reviewed and her foot x-ray shows ? pencil in cup erosive deformity right 2nd toe IP joint as well as excessive bone formation around the distal left fibula head (she had prior surgery right 2nd toe); son with psoriasis    Continue p.r.n. NSAID.  Add pregabalin for fibromyalgia.  I also gave her Medrol Dosepak to see if this might calm down current flare of neck pain with numbness in the right shoulder.  Consult [facility] with her history of cervical fusion years ago while living in [place] around .  She previously tried physical therapy  Recommend addition of Otezla for suspected psoriatic arthritis.  Will prior authorize Otezla.  Risks benefits of Otezla discussed and handout provided on Otezla.  Return to clinic 3 months

## 2024-07-01 NOTE — ASSESSMENT & PLAN NOTE
Pregabalin  QTB and hepatitis panel - 5/3/2024    Pain management agreement signed 7/1/2024  Redd reviewed and appropriate.  Patient is at low risk for medication abuse  Discussed risks of pregabalin such as sedation dizziness withdrawal allergic reaction  Discussed that pregabalin is a controlled medication and she should keep it in a locked cabinet  Intermittent UDS for monitoring

## 2024-07-01 NOTE — ASSESSMENT & PLAN NOTE
Has seen [facility]  Cervical spine fusion while living in [place] around 2011.    Widespread osteoarthritis affecting her hands, spine, knees.    She has had prior [facility] joint surgery and bilateral knee replacement.    She has had cervical spine fusion years ago while living in [place]  She reports ongoing cervical pain with numbness radiating to the right shoulder that could be related to her neck arthritis/suspected spinal stenosis  She has tried physical therapy in the past.  Continue NSAID.  Consult with [facility] neurosurgery for further evaluation given her history of cervical spine fusion

## 2024-07-01 NOTE — PATIENT INSTRUCTIONS
"Osteoarthritis    Osteoarthritis is a type of arthritis. It refers to joint pain or joint disease. Osteoarthritis affects tissue that covers the ends of bones in joints (cartilage). Cartilage acts as a cushion between the bones and helps them move smoothly. Osteoarthritis occurs when cartilage in the joints gets worn down. Osteoarthritis is sometimes called \"wear and tear\" arthritis.  Osteoarthritis is the most common form of arthritis. It often occurs in older people. It is a condition that gets worse over time. The joints most often affected by this condition are in the fingers, toes, hips, knees, and spine, including the neck and lower back.    What are the causes?  This condition is caused by the wearing down of cartilage that covers the ends of bones.    What increases the risk?  The following factors may make you more likely to develop this condition:  Being age 50 or older.  Obesity.  Overuse of joints.  Past injury of a joint.  Past surgery on a joint.  Family history of osteoarthritis.    What are the signs or symptoms?  The main symptoms of this condition are pain, swelling, and stiffness in the joint. Other symptoms may include:  An enlarged joint.  More pain and further damage caused by small pieces of bone or cartilage that break off and float inside of the joint.  Small deposits of bone (osteophytes) that grow on the edges of the joint.  A grating or scraping feeling inside the joint when you move it.  Popping or creaking sounds when you move.  Difficulty walking or exercising.  An inability to  items, twist your hand, or control the movements of your hands and fingers.    How is this diagnosed?  This condition may be diagnosed based on:  Your medical history.  A physical exam.  Your symptoms.  X-rays of the affected joints.  Blood tests to rule out other types of arthritis.    How is this treated?  There is no cure for this condition, but treatment can help control pain and improve joint function. " Treatment may include a combination of therapies, such as:  Pain relief techniques, such as:  Applying heat and cold to the joint.  Massage.  A form of talk therapy called cognitive behavioral therapy (CBT). This therapy helps you set goals and follow up on the changes that you make.  Medicines for pain and inflammation. The medicines can be taken by mouth or applied to the skin. They include:  NSAIDs, such as ibuprofen.  Prescription medicines.  Strong anti-inflammatory medicines (corticosteroids).  Certain nutritional supplements.  A prescribed exercise program. You may work with a physical therapist.  Assistive devices, such as a brace, wrap, splint, specialized glove, or cane.  A weight control plan.  Surgery, such as:  An osteotomy. This is done to reposition the bones and relieve pain or to remove loose pieces of bone and cartilage.  Joint replacement surgery. You may need this surgery if you have advanced osteoarthritis.    Follow these instructions at home:    Activity  Rest your affected joints as told by your health care provider.  Exercise as told by your provider. The provider may recommend specific types of exercise, such as:  Strengthening exercises. These are done to strengthen the muscles that support joints affected by arthritis.  Aerobic activities. These are exercises, such as brisk walking or water aerobics, that increase your heart rate.  Range-of-motion activities. These help your joints move more easily.  Balance and agility exercises.    Managing pain, stiffness, and swelling         If told, apply heat to the affected area as often as told by your provider. Use the heat source that your provider recommends, such as a moist heat pack or a heating pad.  If you have a removable assistive device, remove it as told by your provider.  Place a towel between your skin and the heat source. If your provider tells you to keep the assistive device on while you apply heat, place a towel between the  assistive device and the heat source.  Leave the heat on for 20-30 minutes.  If told, put ice on the affected area.  If you have a removable assistive device, remove it as told by your provider.  Put ice in a plastic bag.  Place a towel between your skin and the bag. If your provider tells you to keep the assistive device on during icing, place a towel between the assistive device and the bag.  Leave the ice on for 20 minutes, 2-3 times a day.  If your skin turns bright red, remove the ice or heat right away to prevent skin damage. The risk of damage is higher if you cannot feel pain, heat, or cold.  Move your fingers or toes often to reduce stiffness and swelling.  Raise (elevate) the affected area above the level of your heart while you are sitting or lying down.    General instructions  Take over-the-counter and prescription medicines only as told by your provider.  Maintain a healthy weight. Follow instructions from your provider for weight control.  Do not use any products that contain nicotine or tobacco. These products include cigarettes, chewing tobacco, and vaping devices, such as e-cigarettes. If you need help quitting, ask your provider.  Use assistive devices as told by your provider.    Where to find more information  National [place] of Arthritis and Musculoskeletal and Skin Diseases: niams.nih.gov  National [place] on Aging: keily.nih.gov  American College of Rheumatology: rheumatology.org    Contact a health care provider if:  You have redness, swelling, or a feeling of warmth in a joint that gets worse.  You have a fever along with joint or muscle aches.  You develop a rash.  You have trouble doing your normal activities.  You have pain that gets worse and is not relieved by pain medicine.    This information is not intended to replace advice given to you by your health care provider. Make sure you discuss any questions you have with your health care provider.  Document Revised: 08/17/2023  Document Reviewed: 08/17/2023  [employer] Patient Education © 2024 [employer] Myofascial Pain Syndrome and Fibromyalgia    Myofascial pain syndrome and fibromyalgia are both pain disorders. You may feel this pain mainly in your muscles.  Myofascial pain syndrome:  Always has tender points in the muscles that will cause pain when pressed (trigger points). The pain may come and go.  Usually affects your neck, upper back, and shoulder areas. The pain often moves into your arms and hands.  Fibromyalgia:  Has muscle pains and tenderness that come and go.  Is often associated with tiredness (fatigue) and sleep problems.  Has trigger points.  Tends to be long-lasting (chronic), but is not life-threatening.  Fibromyalgia and myofascial pain syndrome are not the same. However, they often occur together. If you have both conditions, each can make the other worse. Both are common and can cause enough pain and fatigue to make day-to-day activities difficult. Both can be hard to diagnose because their symptoms are common in many other conditions.    What are the causes?  The exact causes of these conditions are not known.    What increases the risk?  You are more likely to develop either of these conditions if:  You have a family history of the condition.  You are female.  You have certain triggers, such as:  Spine disorders.  An injury (trauma) or other physical stressors.  Being under a lot of stress.  Medical conditions such as osteoarthritis, rheumatoid arthritis, or lupus.    What are the signs or symptoms?    Fibromyalgia  The main symptom of fibromyalgia is widespread pain and tenderness in your muscles. Pain is sometimes described as stabbing, shooting, or burning.  You may also have:  Tingling or numbness.  Sleep problems and fatigue.  Problems with attention and concentration (fibro fog).  Other symptoms may include:  Bowel and bladder problems.  Headaches.  Vision problems.  Sensitivity to odors and  noises.  Depression or mood changes.  Painful menstrual periods (dysmenorrhea).  Dry skin or eyes.  These symptoms can vary over time.    Myofascial pain syndrome  Symptoms of myofascial pain syndrome include:  Tight, ropy bands of muscle.  Uncomfortable sensations in muscle areas. These may include aching, cramping, burning, numbness, tingling, and weakness.  Difficulty moving certain parts of the body freely (poor range of motion).    How is this diagnosed?  This condition may be diagnosed by your symptoms and medical history. You will also have a physical exam. In general:  Fibromyalgia is diagnosed if you have pain, fatigue, and other symptoms for more than 3 months, and symptoms cannot be explained by another condition.  Myofascial pain syndrome is diagnosed if you have trigger points in your muscles, and those trigger points are tender and cause pain elsewhere in your body (referred pain).    How is this treated?    Treatment for these conditions depends on the type that you have.  For fibromyalgia, a healthy lifestyle is the most important treatment including aerobic and strength exercises. Different types of medicines are used to help treat pain and include:  NSAIDs.  Medicines for treating depression.  Medicines that help control seizures.  Medicines that relax the muscles.  Treatment for myofascial pain syndrome includes:  Pain medicines, such as NSAIDs.  Cooling and stretching of muscles.  Massage therapy with myofascial release technique.  Trigger point injections.    Treating these conditions often requires a team of health care providers. These may include:  Your primary care provider.  A physical therapist.  Complementary health care providers, such as massage therapists or acupuncturists.  A psychiatrist for cognitive behavioral therapy.    Follow these instructions at home:  Medicines  Take over-the-counter and prescription medicines only as told by your health care provider.  Ask your health care  provider if the medicine prescribed to you:  Requires you to avoid driving or using machinery.  Can cause constipation. You may need to take these actions to prevent or treat constipation:  Drink enough fluid to keep your urine pale yellow.  Take over-the-counter or prescription medicines.  Eat foods that are high in fiber, such as beans, whole grains, and fresh fruits and vegetables.  Limit foods that are high in fat and processed sugars, such as fried or sweet foods.    Lifestyle    Do exercises as told by your health care provider or physical therapist.  Practice relaxation techniques to control your stress. You may want to try:  Biofeedback.  Visual imagery.  Hypnosis.  Muscle relaxation.  Yoga.  Meditation.  Maintain a healthy lifestyle. This includes eating a healthy diet and getting enough sleep.  Do not use any products that contain nicotine or tobacco. These products include cigarettes, chewing tobacco, and vaping devices, such as e-cigarettes. If you need help quitting, ask your health care provider.    General instructions  Talk to your health care provider about complementary treatments, such as acupuncture or massage.  Do not do activities that stress or strain your muscles. This includes repetitive motions and heavy lifting.  Keep all follow-up visits. This is important.    Where to find support  Consider joining a support group with others who are diagnosed with this condition.  National Fibromyalgia Association: fmaware.org    Where to find more information  [place] [employer] Foundation: uspainfoundation.org    Contact a health care provider if:  You have new symptoms.  Your symptoms get worse or your pain is severe.  You have side effects from your medicines.  You have trouble sleeping.  Your condition is causing depression or anxiety.    Get help right away if:  You have thoughts of hurting yourself or others.    Get help right away if you feel like you may hurt yourself or others, or have thoughts about  taking your own life. Go to your nearest emergency room or:  Call 911.  Call the National Suicide Prevention Lifeline at 1-992.532.6135 or 006. This is open 24 hours a day.  Text the Crisis Text Line at 627839.  This information is not intended to replace advice given to you by your health care provider. Make sure you discuss any questions you have with your health care provider.  Document Revised: 09/25/2023 Document Reviewed: 11/18/2022  [employer] Patient Education © 2024 [employer]

## 2024-07-01 NOTE — PROGRESS NOTES
Office Follow Up      Date: 07/01/2024   Patient Name: Payton Parks  MRN: [id number]  YOB: 1965    Referring Physician: Silke Leahy, *     Chief Complaint: Chronic joint and muscle pain, osteoarthritis      History of Present Illness: Payton Parks is a 59 y.o. female who is here today for follow up on generalized osteoarthritis and chronic joint pain.      She states she is under lot of stress as her fiance has terminal cancer and is in hospice.  She has been aching all over in the muscles.  She is fatigued.      Joint pain is moderate and constant primarily affecting her hands, spine, left hip, feet.  She has developed hammertoes feet..  She reports burning sensation around her toes but no numbness.  No particular swelling to the joints.  Her neck is stiff and sore.  She gets numbness in the right shoulder.  She has tried physical therapy for her spine.  Celebrex is helpful and well tolerated for joint pain and stiffness.  She is also on tramadol per her PCP for chronic pain.  She previously tried gabapentin.  She notes that the weather in [place] seems to exacerbate her chronic joint pain compared to [place] weather where she previously lived.  Standing is very difficult because of pain and stiffness in the joints.  She has no history of gout.  No fevers or weight loss.  She is fatigued.      Activity makes the joint pain worse.  Rest makes it somewhat better.  No swollen lymph nodes.  She does bruise easily.    She previously followed with Pain Management in [place] and reports prior steroid injections in her spine and prior rhizotomy.  She has also done some dry needling.  Rheumatoid markers and MAURICE negative on labs  She has developed hammertoes and is following with a podiatrist in [place].     She has a rash dorsum hands, forearms and right anterior shoulder.   Follows with dermatology in [place] who diagnosed granuloma.  No history of psoriasis  although her son has psoriasis      Subjective       Review of Systems: Review of Systems   Constitutional:  Negative for chills, fatigue, fever and unexpected weight loss.   HENT:  Negative for mouth sores, sinus pressure and sore throat.         Dry mouth  Nose sores   Eyes:  Negative for pain and redness.        Dry eyes   Respiratory:  Negative for cough and shortness of breath.    Cardiovascular:  Negative for chest pain.   Gastrointestinal:  Negative for abdominal pain, blood in stool, diarrhea, nausea, vomiting and GERD.   Endocrine: Negative for polydipsia and polyuria.   Genitourinary:  Negative for dysuria, genital sores and hematuria.   Musculoskeletal:  Positive for arthralgias, joint swelling, neck pain and neck stiffness. Negative for back pain and myalgias.   Skin:  Positive for rash. Negative for bruise.        Psoriasis  Photosensitvity  Malar rash   Allergic/Immunologic: Negative for immunocompromised state.   Neurological:  Negative for seizures, weakness, numbness and memory problem.   Hematological:  Negative for adenopathy. Does not bruise/bleed easily.   Psychiatric/Behavioral:  Positive for depressed mood. The patient is nervous/anxious.         Medications:   Current Outpatient Medications:     ALPRAZolam (XANAX) 0.5 MG tablet, Take 1 tablet by mouth 2 (Two) Times a Day As Needed., Disp: , Rfl:     buPROPion XL (Wellbutrin XL) 300 MG 24 hr tablet, Take 1 tablet by mouth Daily., Disp: , Rfl:     busPIRone (BUSPAR) 5 MG tablet, , Disp: , Rfl:     cyclobenzaprine (FLEXERIL) 10 MG tablet, Take 1 tablet by mouth 3 times a day., Disp: , Rfl:     desvenlafaxine (PRISTIQ) 50 MG 24 hr tablet, Take 1 tablet by mouth Daily., Disp: , Rfl:     estradiol (MINIVELLE, VIVELLE-DOT) 0.05 MG/24HR patch, , Disp: , Rfl:     Gemtesa 75 MG tablet, Take 1 tablet by mouth Daily., Disp: , Rfl:     levETIRAcetam (KEPPRA) 500 MG tablet, Take 1 tablet by mouth Daily., Disp: , Rfl:     levothyroxine (SYNTHROID,  "LEVOTHROID) 50 MCG tablet, , Disp: , Rfl:     pregabalin (LYRICA) 100 MG capsule, Take 1 capsule by mouth 2 (Two) Times a Day., Disp: 180 capsule, Rfl: 1    rOPINIRole (REQUIP) 0.5 MG tablet, , Disp: , Rfl:     SUMAtriptan (IMITREX) 100 MG tablet, , Disp: , Rfl:     traMADol (ULTRAM) 50 MG tablet, Take 1-2 tablets every 8 hours as needed, Disp: , Rfl:     zolpidem (Ambien) 10 MG tablet, Take 1 tablet by mouth At Night As Needed., Disp: , Rfl:     CeleBREX 200 MG capsule, Take 1 capsule by mouth Every 12 (Twelve) Hours., Disp: , Rfl:     Allergies:   Allergies   Allergen Reactions    Contrast Dye (Echo Or Unknown Ct/Mr) Unknown - Low Severity    Oxycodone Hives       I have reviewed and updated the patient's chief complaint, history of present illness, review of systems, past medical history, surgical history, family history, social history, medications and allergy list as appropriate.     Objective        Vital Signs:   Vitals:    07/01/24 1351   BP: 126/78   BP Location: Left arm   Patient Position: Sitting   Cuff Size: Adult   Pulse: 87   Temp: 97.5 °F (36.4 °C)   Weight: 86.3 kg (190 lb 3.2 oz)   Height: 165.1 cm (65\")   PainSc:   3     Body mass index is 31.65 kg/m².      Physical Exam:  Physical Exam   MUSCULOSKELETAL:   No peripheral synovitis  No rheumatoid nodules or tophi.  No pitting of the nails.  No ulnar deviation.  Osteoarthritic changes present hands with Heberden nodules  Scars CMC joints bilateral thumbs from prior CMC surgery  Tender cervical spine with limited range of motion  No tenderness to the sacroiliac joints.  Bilateral knees with well-healed scar from joint replacement  Hammertoes present feet  Widespread tender points present above and below the waist      Complete joint exam was performed including the MCPs, PIPs, DIPs of the hands, wrists, elbows, shoulders, hips, knees and ankles.  No soft tissue swelling or tenderness is present except as above.    General: The patient is " "well-developed and well nourished. Cooperative, alert and oriented. Affect is normal. Hydration appears normal.   HEENT: Normocephalic and atraumatic. No notable alopecia. Lids and conjunctiva are normal. Pupils are equal and sclera are clear. Oropharynx is clear   NECK neck is supple without adenopathy, masses or thyromegaly.   CARDIOVASCULAR: Regular rate and rhythm. No murmurs, rubs or gallops   LUNGS: Effort is normal. Lungs are clear bilateral   ABDOMEN: Not examined  EXTREMITIES: Peripheral pulses are intact. No clubbing.   SKIN: No rashes. No subcutaneous nodules. No digital ulcers. No sclerodactyly.   NEUROLOGIC: Gait is normal. Strength testing is normal.  No focal neurologic deficits    Results Review:   Labs:   Lab Results   Component Value Date    GLUCOSE 96 2024    BUN 7 2024    CREATININE 0.73 2024    EGFRRESULT 95 2024    BCR 10 2024    K 4.6 2024    CO2 24 2024    CALCIUM 9.7 2024    PROTENTOTREF 7.0 2024    ALBUMIN 3.6 2024    ALBUMIN 4.1 2024    BILITOT 0.2 2024    AST 28 2024    ALT 28 2024     Lab Results   Component Value Date    WBC 4.7 2024    HGB 13.2 2024    HCT 39.2 2024    MCV 91 2024     2024     Lab Results   Component Value Date    SEDRATE 3 2024     No results found for: \"CRP\"  No results found for: \"QUANTIFERO\", \"QUANTITB1\", \"QUANTITB2\", \"QUANTIFERN\", \"QUANTIFERM\", \"QUANTITBGLDP\"  No results found for: \"RF\"  No results found for: \"HEPBSAG\", \"HEPAIGM\", \"HEPBIGMCORE\", \"HEPCVIRUSABY\"      Procedures    Assessment / Plan        Assessment & Plan  Fibromyalgia  Moved from [place] .  Former   of alcoholism  .  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  s/p bilateral knee replacements, cervical spine fusion , [facility] joint surgery  Prior spine injections/rhizotomy with pain management in " [place]  X-rays 3/29/2024 right foot x-ray shows ? pencil in cup erosive deformity right 2nd toe IP joint as well as excessive bone formation around the distal left fibula head (she had prior surgery right 2nd toe); son with psoriasis  Labs 3/29/2024: Normal sed rate/CRP/HLA-B27/rheumatoid factor/ACPA, +MAURICE 1:320 with negative ZACARIAS panel  Specialists: Neurology neurosurgery, podiatry  Dr. Douglas Mays dismissed 6/11/2024 after no monoclonal gammopathy found  **Current:  Celebrex, tramadol(PCP) pregabalin(ACL)  Prior Otezla PA 3/29/24(Humira prerferred by her insurance),   Tried:  physical therapy, injection left hip with orthopedics in [place].      She reports widespread musculoskeletal pain.  Her muscles her to press on.  I think she likely has fibromyalgia along with osteoarthritis.  There has been consideration for psoriatic arthritis based on x-ray right second toe with question of pencil in cup deformity, although I think this is actually more from prior surgery on the toe and not true psoriatic arthritis.  There is no history of psoriasis herself although her son has psoriasis.  Her insurance declined Otezla 4/24 and she is considering Humira which is their preferred biologic, but she is reluctant given the risk of infection with it.  At this time I would avoid starting Humira/biologic therapy as I do not see evidence of active inflammatory arthritis      Overall she reports significant improvement chronic pain with pregabalin 75 mg twice daily but still burning in the feet.  Continue p.r.n. NSAID.   Increase pregabalin 100 mg twice daily for fibromyalgia.   Consult [facility] with her history of cervical fusion years ago while living in [place] around 2011.  She previously tried physical therapy  Obtain labs today as below  Return to clinic 6 months    She has widespread tender points above and below the waist consistent with fibromyalgia.  She is under lot of stress as her fiance has terminal  cancer  Continue pregabalin  Discussed potential side effects of pregabalin including dizziness sedation falls allergic reaction memory disturbance  Pain management agreement signed 7/1/2024.  Redd reviewed and appropriate.  Patient is at low risk for medication abuse   - I have encouraged regular low impact aerobic exercise up to 30 minutes per day. If this is unattainable, recommend a graded exercise program starting with 5 minutes of dedicated cardiovascular exercise daily, increasing by 1 minute daily until goal of 30 minutes daily is reached.  - I have recommended conservative measures to improve sleep  - consider referral to sleep medicine for EUGENE screening  - recommend avoiding narcotics studies have shown that narcotics can worsen fibromyalgia pain.   - Counseled on alternative therapies that can help with pain including massage therapy, aquatic therapy, physical therapy, acupuncture, chiropractics, and psychology evaluation  High risk medication use  Pregabalin  QTB and hepatitis panel - 5/3/2024    Pain management agreement signed 7/1/2024  Redd reviewed and appropriate.  Patient is at low risk for medication abuse  Discussed risks of pregabalin such as sedation dizziness withdrawal allergic reaction  Discussed that pregabalin is a controlled medication and she should keep it in a locked cabinet  Intermittent UDS for monitoring  Generalized osteoarthrosis, involving multiple sites  Has seen [facility]  Cervical spine fusion while living in [place] around 2011.    Widespread osteoarthritis affecting her hands, spine, knees.    She has had prior CMC joint surgery and bilateral knee replacement.    She has had cervical spine fusion years ago while living in [place]  She reports ongoing cervical pain with numbness radiating to the right shoulder that could be related to her neck arthritis/suspected spinal stenosis  She has tried physical therapy in the past.  Continue NSAID.  Consult with  [facility] for further evaluation given her history of cervical spine fusion  S/P cervical spinal fusion  2011 in [place]  Hammertoes of both feet  Follows with Podiatry in [place] Dr. Trammell  Neuropathy  Possible.  Following with neurology.    Orders Placed This Encounter   Procedures    Comprehensive Metabolic Panel    CBC Auto Differential    C-reactive Protein    Sedimentation Rate    Vitamin B12    Protein Elec + Interp, Serum    Hemoglobin A1c    Urine Drug Screen - Urine, Clean Catch     New Medications Ordered This Visit   Medications    pregabalin (LYRICA) 100 MG capsule     Sig: Take 1 capsule by mouth 2 (Two) Times a Day.     Dispense:  180 capsule     Refill:  1           Follow Up:   Return in about 6 months (around 1/1/2025).        Riaz Winters MD  [facility]

## 2024-07-02 LAB
ALBUMIN SERPL-MCNC: 4.2 G/DL (ref 3.5–5.2)
ALBUMIN/GLOB SERPL: 1.2 G/DL
ALP SERPL-CCNC: 124 U/L (ref 39–117)
ALT SERPL W P-5'-P-CCNC: 20 U/L (ref 1–33)
ANION GAP SERPL CALCULATED.3IONS-SCNC: 9 MMOL/L (ref 5–15)
AST SERPL-CCNC: 27 U/L (ref 1–32)
BILIRUB SERPL-MCNC: 0.2 MG/DL (ref 0–1.2)
BUN SERPL-MCNC: 13 MG/DL (ref 6–20)
BUN/CREAT SERPL: 17.8 (ref 7–25)
CALCIUM SPEC-SCNC: 9.7 MG/DL (ref 8.6–10.5)
CHLORIDE SERPL-SCNC: 102 MMOL/L (ref 98–107)
CO2 SERPL-SCNC: 28 MMOL/L (ref 22–29)
CREAT SERPL-MCNC: 0.73 MG/DL (ref 0.57–1)
CRP SERPL-MCNC: 0.47 MG/DL (ref 0–0.5)
EGFRCR SERPLBLD CKD-EPI 2021: 94.9 ML/MIN/1.73
GLOBULIN UR ELPH-MCNC: 3.5 GM/DL
GLUCOSE SERPL-MCNC: 81 MG/DL (ref 65–99)
POTASSIUM SERPL-SCNC: 3.8 MMOL/L (ref 3.5–5.2)
PROT SERPL-MCNC: 7.7 G/DL (ref 6–8.5)
SODIUM SERPL-SCNC: 139 MMOL/L (ref 136–145)
VIT B12 BLD-MCNC: 849 PG/ML (ref 211–946)

## 2024-07-03 LAB
ALBUMIN SERPL ELPH-MCNC: 3.8 G/DL (ref 2.9–4.4)
ALBUMIN/GLOB SERPL: 1 {RATIO} (ref 0.7–1.7)
ALPHA1 GLOB SERPL ELPH-MCNC: 0.3 G/DL (ref 0–0.4)
ALPHA2 GLOB SERPL ELPH-MCNC: 0.7 G/DL (ref 0.4–1)
B-GLOBULIN SERPL ELPH-MCNC: 1.3 G/DL (ref 0.7–1.3)
GAMMA GLOB SERPL ELPH-MCNC: 1.5 G/DL (ref 0.4–1.8)
GLOBULIN SER CALC-MCNC: 3.8 G/DL (ref 2.2–3.9)
LABORATORY COMMENT REPORT: NORMAL
M PROTEIN SERPL ELPH-MCNC: NORMAL G/DL
PROT PATTERN SERPL ELPH-IMP: NORMAL
PROT SERPL-MCNC: 7.6 G/DL (ref 6–8.5)

## 2024-09-10 ENCOUNTER — TELEPHONE (OUTPATIENT)
Age: 59
End: 2024-09-10
Payer: OTHER GOVERNMENT

## 2024-09-10 NOTE — TELEPHONE ENCOUNTER
Kayli advised pt that she will need an insurance referral to be seen here at [facility]. Pt currently has [facility].

## 2024-12-26 DIAGNOSIS — M79.7 FIBROMYALGIA: ICD-10-CM

## 2024-12-27 RX ORDER — PREGABALIN 100 MG/1
100 CAPSULE ORAL 2 TIMES DAILY
Qty: 180 CAPSULE | Refills: 0 | Status: SHIPPED | OUTPATIENT
Start: 2024-12-27

## 2025-01-14 ENCOUNTER — TELEPHONE (OUTPATIENT)
Age: 60
End: 2025-01-14
Payer: OTHER GOVERNMENT

## 2025-01-14 NOTE — TELEPHONE ENCOUNTER
1/14 Called pt to reschedule appt from last week due to office being closed due to weather lmom to call the office to schedule.

## 2025-04-26 DIAGNOSIS — M79.7 FIBROMYALGIA: ICD-10-CM

## 2025-04-28 RX ORDER — PREGABALIN 100 MG/1
100 CAPSULE ORAL 2 TIMES DAILY
Qty: 180 CAPSULE | Refills: 0 | OUTPATIENT
Start: 2025-04-28

## 2025-04-28 NOTE — TELEPHONE ENCOUNTER
Patient needs appointment for pregabalin refill. Last seen 7/1/24, canceled 1/6/25 and has not rescheduled. Declining pregabalin refill request at this time. Must be seen at least every 6 months as it is a controlled medication.    HUB OK TO RELAY